# Patient Record
Sex: FEMALE | Race: WHITE | NOT HISPANIC OR LATINO | Employment: FULL TIME | URBAN - METROPOLITAN AREA
[De-identification: names, ages, dates, MRNs, and addresses within clinical notes are randomized per-mention and may not be internally consistent; named-entity substitution may affect disease eponyms.]

---

## 2023-09-08 PROBLEM — G47.00 INSOMNIA: Status: ACTIVE | Noted: 2023-09-08

## 2023-09-08 PROBLEM — F41.9 ANXIETY: Status: ACTIVE | Noted: 2023-09-08

## 2023-09-08 PROBLEM — F17.200 LIGHT TOBACCO SMOKER: Status: ACTIVE | Noted: 2023-09-08

## 2023-09-08 PROBLEM — N92.1 METRORRHAGIA: Status: ACTIVE | Noted: 2023-09-08

## 2023-09-08 PROBLEM — F43.0 STRESS REACTION: Status: ACTIVE | Noted: 2023-09-08

## 2023-09-08 PROBLEM — Z86.59 HISTORY OF ADHD: Status: ACTIVE | Noted: 2023-09-08

## 2023-09-08 PROBLEM — K21.9 GASTROESOPHAGEAL REFLUX DISEASE: Status: ACTIVE | Noted: 2023-09-08

## 2023-09-08 PROBLEM — F32.A DEPRESSION: Status: ACTIVE | Noted: 2023-09-08

## 2023-09-08 PROBLEM — F41.9 ANXIETY AND DEPRESSION: Status: ACTIVE | Noted: 2023-09-08

## 2023-09-08 PROBLEM — F32.A ANXIETY AND DEPRESSION: Status: ACTIVE | Noted: 2023-09-08

## 2023-09-08 RX ORDER — HYDROGEN PEROXIDE 3 %
1 SOLUTION, NON-ORAL MISCELLANEOUS DAILY PRN
COMMUNITY
Start: 2016-02-12

## 2023-09-08 RX ORDER — EPINEPHRINE 0.3 MG/.3ML
INJECTION INTRAMUSCULAR
COMMUNITY

## 2023-09-08 RX ORDER — CITALOPRAM 40 MG/1
1 TABLET, FILM COATED ORAL DAILY
COMMUNITY
Start: 2022-04-15 | End: 2024-04-22

## 2023-09-08 RX ORDER — ALPRAZOLAM 0.5 MG/1
1 TABLET ORAL DAILY PRN
COMMUNITY
Start: 2015-04-29

## 2023-09-08 RX ORDER — HYDROXYZINE HYDROCHLORIDE 25 MG/1
1 TABLET, FILM COATED ORAL NIGHTLY PRN
COMMUNITY
Start: 2022-08-19

## 2023-09-08 RX ORDER — NORGESTIMATE AND ETHINYL ESTRADIOL 0.25-0.035
1 KIT ORAL DAILY
COMMUNITY
Start: 2022-08-19

## 2023-09-08 RX ORDER — OMEPRAZOLE 40 MG/1
1 CAPSULE, DELAYED RELEASE ORAL
COMMUNITY
Start: 2023-02-14

## 2023-09-08 RX ORDER — PAROXETINE HYDROCHLORIDE 20 MG/1
1 TABLET, FILM COATED ORAL 2 TIMES DAILY
COMMUNITY
Start: 2015-05-15

## 2023-09-08 RX ORDER — DROSPIRENONE AND ETHINYL ESTRADIOL 0.02-3(28)
1 KIT ORAL DAILY
COMMUNITY
Start: 2016-02-12

## 2023-10-09 PROCEDURE — 87800 DETECT AGNT MULT DNA DIREC: CPT

## 2023-10-09 PROCEDURE — 87661 TRICHOMONAS VAGINALIS AMPLIF: CPT

## 2023-10-10 ENCOUNTER — LAB REQUISITION (OUTPATIENT)
Dept: LAB | Facility: HOSPITAL | Age: 28
End: 2023-10-10
Payer: COMMERCIAL

## 2023-10-10 DIAGNOSIS — Z20.2 CONTACT WITH AND (SUSPECTED) EXPOSURE TO INFECTIONS WITH A PREDOMINANTLY SEXUAL MODE OF TRANSMISSION: ICD-10-CM

## 2023-10-12 LAB
C TRACH RRNA SPEC QL NAA+PROBE: NEGATIVE
N GONORRHOEA DNA SPEC QL PROBE+SIG AMP: NEGATIVE
T VAGINALIS RRNA SPEC QL NAA+PROBE: NEGATIVE

## 2024-03-10 DIAGNOSIS — F41.9 ANXIETY AND DEPRESSION: Primary | ICD-10-CM

## 2024-03-10 DIAGNOSIS — F32.A ANXIETY AND DEPRESSION: Primary | ICD-10-CM

## 2024-04-10 NOTE — TELEPHONE ENCOUNTER
SPOKE TO THE PATIENT AND THE PATIENT STATED THAT SHE DID NOT WANT TO MAKE AN APPOINTMENT AT THIS TIME BECAUSE SHE DOES NOT HAVE ANY INSURANCE

## 2024-04-22 RX ORDER — CITALOPRAM 40 MG/1
40 TABLET, FILM COATED ORAL DAILY
Qty: 30 TABLET | Refills: 1 | Status: SHIPPED | OUTPATIENT
Start: 2024-04-22

## 2024-11-08 NOTE — PROGRESS NOTES
Subjective   Patient ID:   Vickie Todd is a 29 y.o. female who presents for Establish Care.  HPI  New patient here today to establish care with myself.  Previous PCP:   Last seen:    Anxiety/Depression:  Taking Celexa - has not had a supply recently.  Has been taking it on and off for the past few months.  Had been on Hydroxyzine and Paxil in the past.  Interested in genetic testing for medications instead of trial and error.   Denies SI/HI.    GERD:  Taking Prilosec.  Needing a refill.    Abscess:  Symptoms x a few weeks.  Getting bigger.  Was painful.  Left buttocks region.    Health maintenance:  Smoking: Former smoker.  Labs: DUE  Influenza: Nov 2024.    Review of Systems  12 point review of systems negative unless stated above in HPI    Vitals:    11/14/24 0851   BP: 120/90   Pulse: 93   SpO2: 99%     Physical Exam  General: Alert and oriented, well nourished, no acute distress.  Lungs: Clear to auscultation, non-labored respiration.  Heart: Normal rate, regular rhythm, no murmur, gallop or edema.  Neurologic: Awake, alert, and oriented X3, CN II-XII intact.  Psychiatric: Cooperative, appropriate mood and affect.  Skin: Chaperone offered and declined. Small abscess noted on left buttocks    Assessment/Plan   It was nice meeting you today!  I have refilled the Celexa.  I have placed a referral to psychiatry for further management and for genetic testing.  I have ordered some labs to be done as soon as you can.  We will call you with the results.  I have sent in Keflex to try for the abscess.  Consider general surgery and/or a 2nd antibiotic.  Flu vaccine given today.  If symptoms persist or worsen despite current plan of care, please contact your healthcare provider for further evaluation.  Patient instructed to contact the office if there are any questions regarding their care or treatment.   [facility] [phone]    Fu 1 month  Diagnoses and all orders for this visit:  Anxiety and  depression  -     citalopram (CeleXA) 40 mg tablet; Take 1 tablet (40 mg) by mouth once daily.  -     Referral to Psychiatry; Future  -     Comprehensive Metabolic Panel; Future  -     Lipid Panel; Future  -     CBC and Auto Differential; Future  Gastroesophageal reflux disease without esophagitis  -     omeprazole (PriLOSEC) 40 mg DR capsule; Take 1 capsule (40 mg) by mouth once daily in the morning. Take before meals.  Abscess  -     cephalexin (Keflex) 500 mg capsule; Take 1 capsule (500 mg) by mouth 2 times a day for 7 days.  Need for influenza vaccination  -     Flu vaccine, trivalent, preservative free, age 6 months and greater (Fluraix/Fluzone/Flulaval)  Elevated blood pressure reading

## 2024-11-14 ENCOUNTER — OFFICE VISIT (OUTPATIENT)
Dept: PRIMARY CARE | Facility: CLINIC | Age: 29
End: 2024-11-14
Payer: COMMERCIAL

## 2024-11-14 VITALS
HEART RATE: 93 BPM | SYSTOLIC BLOOD PRESSURE: 120 MMHG | DIASTOLIC BLOOD PRESSURE: 90 MMHG | BODY MASS INDEX: 23.38 KG/M2 | OXYGEN SATURATION: 99 % | WEIGHT: 132 LBS

## 2024-11-14 DIAGNOSIS — Z23 NEED FOR INFLUENZA VACCINATION: ICD-10-CM

## 2024-11-14 DIAGNOSIS — K21.9 GASTROESOPHAGEAL REFLUX DISEASE WITHOUT ESOPHAGITIS: ICD-10-CM

## 2024-11-14 DIAGNOSIS — L02.91 ABSCESS: ICD-10-CM

## 2024-11-14 DIAGNOSIS — F41.9 ANXIETY AND DEPRESSION: Primary | ICD-10-CM

## 2024-11-14 DIAGNOSIS — F32.A ANXIETY AND DEPRESSION: Primary | ICD-10-CM

## 2024-11-14 DIAGNOSIS — R03.0 ELEVATED BLOOD PRESSURE READING: ICD-10-CM

## 2024-11-14 PROCEDURE — 90656 IIV3 VACC NO PRSV 0.5 ML IM: CPT | Performed by: PHYSICIAN ASSISTANT

## 2024-11-14 PROCEDURE — 99204 OFFICE O/P NEW MOD 45 MIN: CPT | Performed by: PHYSICIAN ASSISTANT

## 2024-11-14 PROCEDURE — 90471 IMMUNIZATION ADMIN: CPT | Performed by: PHYSICIAN ASSISTANT

## 2024-11-14 RX ORDER — OMEPRAZOLE 40 MG/1
40 CAPSULE, DELAYED RELEASE ORAL
Qty: 90 CAPSULE | Refills: 1 | Status: SHIPPED | OUTPATIENT
Start: 2024-11-14 | End: 2025-05-13

## 2024-11-14 RX ORDER — CITALOPRAM 40 MG/1
40 TABLET, FILM COATED ORAL DAILY
Qty: 30 TABLET | Refills: 1 | Status: SHIPPED | OUTPATIENT
Start: 2024-11-14

## 2024-11-14 RX ORDER — CEPHALEXIN 500 MG/1
500 CAPSULE ORAL 2 TIMES DAILY
Qty: 14 CAPSULE | Refills: 0 | Status: SHIPPED | OUTPATIENT
Start: 2024-11-14 | End: 2024-11-21

## 2024-11-14 ASSESSMENT — PATIENT HEALTH QUESTIONNAIRE - PHQ9
SUM OF ALL RESPONSES TO PHQ QUESTIONS 1-9: 16
1. LITTLE INTEREST OR PLEASURE IN DOING THINGS: NEARLY EVERY DAY
3. TROUBLE FALLING OR STAYING ASLEEP OR SLEEPING TOO MUCH: MORE THAN HALF THE DAYS
9. THOUGHTS THAT YOU WOULD BE BETTER OFF DEAD, OR OF HURTING YOURSELF: SEVERAL DAYS
7. TROUBLE CONCENTRATING ON THINGS, SUCH AS READING THE NEWSPAPER OR WATCHING TELEVISION: NEARLY EVERY DAY
6. FEELING BAD ABOUT YOURSELF - OR THAT YOU ARE A FAILURE OR HAVE LET YOURSELF OR YOUR FAMILY DOWN: NEARLY EVERY DAY
10. IF YOU CHECKED OFF ANY PROBLEMS, HOW DIFFICULT HAVE THESE PROBLEMS MADE IT FOR YOU TO DO YOUR WORK, TAKE CARE OF THINGS AT HOME, OR GET ALONG WITH OTHER PEOPLE: SOMEWHAT DIFFICULT
4. FEELING TIRED OR HAVING LITTLE ENERGY: SEVERAL DAYS
SUM OF ALL RESPONSES TO PHQ9 QUESTIONS 1 AND 2: 6
5. POOR APPETITE OR OVEREATING: NOT AT ALL
2. FEELING DOWN, DEPRESSED OR HOPELESS: NEARLY EVERY DAY
8. MOVING OR SPEAKING SO SLOWLY THAT OTHER PEOPLE COULD HAVE NOTICED. OR THE OPPOSITE, BEING SO FIGETY OR RESTLESS THAT YOU HAVE BEEN MOVING AROUND A LOT MORE THAN USUAL: NOT AT ALL

## 2024-11-14 ASSESSMENT — ENCOUNTER SYMPTOMS
DEPRESSION: 0
LOSS OF SENSATION IN FEET: 0
OCCASIONAL FEELINGS OF UNSTEADINESS: 0

## 2024-11-14 ASSESSMENT — LIFESTYLE VARIABLES
HOW OFTEN DO YOU HAVE A DRINK CONTAINING ALCOHOL: 2-3 TIMES A WEEK
HOW MANY STANDARD DRINKS CONTAINING ALCOHOL DO YOU HAVE ON A TYPICAL DAY: 1 OR 2
HOW OFTEN DO YOU HAVE SIX OR MORE DRINKS ON ONE OCCASION: NEVER
SKIP TO QUESTIONS 9-10: 1
AUDIT-C TOTAL SCORE: 3

## 2024-11-14 ASSESSMENT — PAIN SCALES - GENERAL: PAINLEVEL_OUTOF10: 0-NO PAIN

## 2024-12-09 NOTE — PROGRESS NOTES
Subjective   Patient ID:   Vickie Todd is a 29 y.o. female who presents for Follow-up, Hand Pain, and FOOT PAIN.  HPI  Anxiety/Depression:  I referred to psychiatry for genetic testing last visit.  Has not had time to schedule this yet.  Taking Celexa.  Had been on Hydroxyzine and Paxil in the past.  Interested in genetic testing for medications instead of trial and error.   Denies SI/HI.    GERD:  Taking Prilosec.    Abscess:  I gave Keflex last visit.  Consider general surgery.  Symptoms x a few weeks.  Getting bigger.  Was painful.  Left buttocks region.    Tinea pedis:  Symptoms x years.  Both feet.  Dryness and cracking.  On her feet all day.    Health maintenance:  Smoking: Former smoker.  Labs: DUE - ordered last visit.  Influenza: Nov 2024.    Review of Systems  12 point review of systems negative unless stated above in HPI    Vitals:    12/19/24 0956   BP: 116/84   Pulse: 91   SpO2: 98%     Physical Exam  General: Alert and oriented, well nourished, no acute distress.  Lungs: Clear to auscultation, non-labored respiration.  Heart: Normal rate, regular rhythm, no murmur, gallop or edema.  Neurologic: Awake, alert, and oriented X3, CN II-XII intact.  Psychiatric: Cooperative, appropriate mood and affect.    Assessment/Plan   It was good seeing you!  Refills were sent.  I have sent in Nystatin powder to try for your feet.  I have placed a referral to podiatry for further management.  I have placed a referral to dermatology for further management.  If symptoms persist or worsen despite current plan of care, please contact your healthcare provider for further evaluation.  Patient instructed to contact the office if there are any questions regarding their care or treatment.   [facility] [phone]    Fu 6 months  Diagnoses and all orders for this visit:  Anxiety and depression  -     citalopram (CeleXA) 40 mg tablet; Take 1 tablet (40 mg) by mouth once daily.  Abscess  Gastroesophageal  reflux disease without esophagitis  Allergy to banana  -     EPINEPHrine (EpiPen 2-Foster) 0.3 mg/0.3 mL injection syringe; Inject 0.3 mL (0.3 mg) into the muscle 1 time if needed for anaphylaxis for up to 2 doses. Inject into upper leg. Call 911 after use.  Skin lesions  -     Referral to Dermatology  Tinea pedis of both feet  -     nystatin (Mycostatin) 100,000 unit/gram powder; Apply 1 Application topically 2 times a day.  -     Referral to Podiatry; Future

## 2024-12-19 ENCOUNTER — OFFICE VISIT (OUTPATIENT)
Dept: PRIMARY CARE | Facility: CLINIC | Age: 29
End: 2024-12-19
Payer: COMMERCIAL

## 2024-12-19 VITALS
HEART RATE: 91 BPM | DIASTOLIC BLOOD PRESSURE: 84 MMHG | SYSTOLIC BLOOD PRESSURE: 116 MMHG | OXYGEN SATURATION: 98 % | BODY MASS INDEX: 23.31 KG/M2 | WEIGHT: 131.6 LBS

## 2024-12-19 DIAGNOSIS — Z91.018 ALLERGY TO BANANA: ICD-10-CM

## 2024-12-19 DIAGNOSIS — L02.91 ABSCESS: ICD-10-CM

## 2024-12-19 DIAGNOSIS — F32.A ANXIETY AND DEPRESSION: Primary | ICD-10-CM

## 2024-12-19 DIAGNOSIS — B35.3 TINEA PEDIS OF BOTH FEET: ICD-10-CM

## 2024-12-19 DIAGNOSIS — L98.9 SKIN LESIONS: ICD-10-CM

## 2024-12-19 DIAGNOSIS — F41.9 ANXIETY AND DEPRESSION: Primary | ICD-10-CM

## 2024-12-19 DIAGNOSIS — K21.9 GASTROESOPHAGEAL REFLUX DISEASE WITHOUT ESOPHAGITIS: ICD-10-CM

## 2024-12-19 PROCEDURE — 99214 OFFICE O/P EST MOD 30 MIN: CPT | Performed by: PHYSICIAN ASSISTANT

## 2024-12-19 RX ORDER — EPINEPHRINE 0.3 MG/.3ML
1 INJECTION INTRAMUSCULAR ONCE AS NEEDED
Qty: 1 EACH | Refills: 1 | Status: SHIPPED | OUTPATIENT
Start: 2024-12-19

## 2024-12-19 RX ORDER — CITALOPRAM 40 MG/1
40 TABLET, FILM COATED ORAL DAILY
Qty: 90 TABLET | Refills: 1 | Status: SHIPPED | OUTPATIENT
Start: 2024-12-19 | End: 2025-06-17

## 2024-12-19 RX ORDER — NYSTATIN 100000 [USP'U]/G
1 POWDER TOPICAL 2 TIMES DAILY
Qty: 60 G | Refills: 1 | Status: SHIPPED | OUTPATIENT
Start: 2024-12-19 | End: 2025-12-19

## 2024-12-19 ASSESSMENT — LIFESTYLE VARIABLES
HOW MANY STANDARD DRINKS CONTAINING ALCOHOL DO YOU HAVE ON A TYPICAL DAY: 1 OR 2
SKIP TO QUESTIONS 9-10: 1
AUDIT-C TOTAL SCORE: 2
HOW OFTEN DO YOU HAVE SIX OR MORE DRINKS ON ONE OCCASION: NEVER
HOW OFTEN DO YOU HAVE A DRINK CONTAINING ALCOHOL: 2-4 TIMES A MONTH

## 2024-12-19 ASSESSMENT — ENCOUNTER SYMPTOMS
OCCASIONAL FEELINGS OF UNSTEADINESS: 0
LOSS OF SENSATION IN FEET: 0
DEPRESSION: 0

## 2024-12-19 ASSESSMENT — PAIN SCALES - GENERAL: PAINLEVEL_OUTOF10: 2

## 2024-12-19 ASSESSMENT — PATIENT HEALTH QUESTIONNAIRE - PHQ9: 1. LITTLE INTEREST OR PLEASURE IN DOING THINGS: NOT AT ALL

## 2025-01-14 ENCOUNTER — TELEPHONE (OUTPATIENT)
Dept: PRIMARY CARE | Facility: CLINIC | Age: 30
End: 2025-01-14
Payer: COMMERCIAL

## 2025-01-14 DIAGNOSIS — Z30.9 ENCOUNTER FOR CONTRACEPTIVE MANAGEMENT, UNSPECIFIED TYPE: Primary | ICD-10-CM

## 2025-01-14 RX ORDER — NORGESTIMATE AND ETHINYL ESTRADIOL 7DAYSX3 28
1 KIT ORAL DAILY
Qty: 28 TABLET | Refills: 12 | Status: SHIPPED | OUTPATIENT
Start: 2025-01-14 | End: 2026-01-14

## 2025-04-07 DIAGNOSIS — B35.3 TINEA PEDIS OF BOTH FEET: ICD-10-CM

## 2025-04-07 RX ORDER — NYSTATIN 100000 [USP'U]/G
1 POWDER TOPICAL 2 TIMES DAILY
Qty: 60 G | Refills: 1 | Status: SHIPPED | OUTPATIENT
Start: 2025-04-07 | End: 2026-04-07

## 2025-04-25 ENCOUNTER — TELEPHONE (OUTPATIENT)
Dept: PRIMARY CARE | Facility: CLINIC | Age: 30
End: 2025-04-25
Payer: COMMERCIAL

## 2025-04-25 NOTE — TELEPHONE ENCOUNTER
Patient has a recurring cyst in her right ear, and would like ATB called into [facility] in [place]. Any questions call patient [phone].

## 2025-04-28 NOTE — PROGRESS NOTES
Subjective   Patient ID:   Vickie Todd is a 30 y.o. female who presents for No chief complaint on file..  HPI  Here with acute symptoms:  Symptoms x    Anxiety/Depression:  I referred to psychiatry for genetic testing previously.  Has not had time to schedule this yet.  Taking Celexa.  Had been on Hydroxyzine and Paxil in the past.  Interested in genetic testing for medications instead of trial and error.   Denies SI/HI.    GERD:  Taking Prilosec.    Abscess:  I referred to dermatology last visit.  I gave Keflex previously.  Consider general surgery.  Symptoms x a few weeks.  Getting bigger.  Was painful.  Left buttocks region.    Tinea pedis:  I gave Nystatin powder last visit.  I also referred to podiatry.  Symptoms x years.  Both feet.  Dryness and cracking.  On her feet all day.    Health maintenance:  Smoking: Former smoker.  Labs: DUE - ordered and not done.  Influenza: Nov 2024.    Review of Systems  12 point review of systems negative unless stated above in HPI    There were no vitals filed for this visit.    Physical Exam  General: Alert and oriented, well nourished, no acute distress.  Lungs: Clear to auscultation, non-labored respiration.  Heart: Normal rate, regular rhythm, no murmur, gallop or edema.  Neurologic: Awake, alert, and oriented X3, CN II-XII intact.  Psychiatric: Cooperative, appropriate mood and affect.    Assessment/Plan     Diagnoses and all orders for this visit:  Anxiety and depression  Abscess  Gastroesophageal reflux disease without esophagitis  Skin lesions  Tinea pedis of both feet

## 2025-05-01 ENCOUNTER — APPOINTMENT (OUTPATIENT)
Dept: PRIMARY CARE | Facility: CLINIC | Age: 30
End: 2025-05-01
Payer: COMMERCIAL

## 2025-05-01 DIAGNOSIS — K21.9 GASTROESOPHAGEAL REFLUX DISEASE WITHOUT ESOPHAGITIS: ICD-10-CM

## 2025-05-01 DIAGNOSIS — L02.91 ABSCESS: ICD-10-CM

## 2025-05-01 DIAGNOSIS — F32.A ANXIETY AND DEPRESSION: Primary | ICD-10-CM

## 2025-05-01 DIAGNOSIS — L98.9 SKIN LESIONS: ICD-10-CM

## 2025-05-01 DIAGNOSIS — B35.3 TINEA PEDIS OF BOTH FEET: ICD-10-CM

## 2025-05-01 DIAGNOSIS — F41.9 ANXIETY AND DEPRESSION: Primary | ICD-10-CM

## 2025-06-09 NOTE — PROGRESS NOTES
Subjective   Patient ID:   Vickie Todd is a 30 y.o. female who presents for Follow-up.  HPI  Labs were ordered and not done.    Anxiety/Depression:  I referred to psychiatry for genetic testing previously - has not had time to do this yet.  Taking Celexa.  Had been on Hydroxyzine and Paxil in the past.  Interested in genetic testing for medications instead of trial and error.   Denies SI/HI.    GERD:  Taking Prilosec.    Abscess:  This has resolved.  I gave Keflex previously.  Consider general surgery.  Was painful.  Left buttocks region.    Tinea pedis:  I gave Nystatin powder last visit.  This seems to be helpful!  I also referred to podiatry and dermatology.  Symptoms x years.  Both feet.  Dryness and cracking.  On her feet all day.    Health maintenance:  Smoking: Former smoker.  Labs: DUE - ordered and not done.  Influenza: Nov 2024.    Review of Systems  12 point review of systems negative unless stated above in HPI    Vitals:    06/19/25 1001   BP: 92/66   Pulse: 83   SpO2: 99%     Physical Exam  General: Alert and oriented, well nourished, no acute distress.  Lungs: Clear to auscultation, non-labored respiration.  Heart: Normal rate, regular rhythm, no murmur, gallop or edema.  Neurologic: Awake, alert, and oriented X3, CN II-XII intact.  Psychiatric: Cooperative, appropriate mood and affect.    Assessment/Plan   It was good seeing you!  I have ordered some labs to be done as soon as you can.  We will call you with the results.  Continue the same medications.  Chronic conditions are stable.  Call with questions or concerns.    Follow up  Yearly and as needed  Diagnoses and all orders for this visit:  Anxiety and depression  -     Comprehensive Metabolic Panel; Future  -     Lipid Panel; Future  -     CBC and Auto Differential; Future  Gastroesophageal reflux disease without esophagitis  -     omeprazole (PriLOSEC) 40 mg DR capsule; Take 1 capsule (40 mg) by mouth once daily in the morning. Take before  meals.  Tinea pedis of both feet  Abscess  Lipid screening  -     Lipid Panel; Future  Allergy to banana  -     EPINEPHrine (EpiPen 2-Foster) 0.3 mg/0.3 mL injection syringe; Inject 0.3 mL (0.3 mg) into the muscle 1 time if needed for anaphylaxis for up to 2 doses. Inject into upper leg. Call 911 after use.  Allergy, sequela  -     fluticasone (Flonase) 50 mcg/actuation nasal spray; Administer 1 spray into each nostril once daily. Shake gently. Before first use, prime pump. After use, clean tip and replace cap.  -     cetirizine (ZyrTEC) 10 mg tablet; Take 1 tablet (10 mg) by mouth once daily.

## 2025-06-19 ENCOUNTER — OFFICE VISIT (OUTPATIENT)
Dept: PRIMARY CARE | Facility: CLINIC | Age: 30
End: 2025-06-19
Payer: COMMERCIAL

## 2025-06-19 VITALS
DIASTOLIC BLOOD PRESSURE: 66 MMHG | BODY MASS INDEX: 25.08 KG/M2 | OXYGEN SATURATION: 99 % | HEART RATE: 83 BPM | SYSTOLIC BLOOD PRESSURE: 92 MMHG | WEIGHT: 141.6 LBS

## 2025-06-19 DIAGNOSIS — Z91.018 ALLERGY TO BANANA: ICD-10-CM

## 2025-06-19 DIAGNOSIS — B35.3 TINEA PEDIS OF BOTH FEET: ICD-10-CM

## 2025-06-19 DIAGNOSIS — F41.9 ANXIETY AND DEPRESSION: Primary | ICD-10-CM

## 2025-06-19 DIAGNOSIS — K21.9 GASTROESOPHAGEAL REFLUX DISEASE WITHOUT ESOPHAGITIS: ICD-10-CM

## 2025-06-19 DIAGNOSIS — T78.40XS ALLERGY, SEQUELA: ICD-10-CM

## 2025-06-19 DIAGNOSIS — F32.A ANXIETY AND DEPRESSION: Primary | ICD-10-CM

## 2025-06-19 DIAGNOSIS — L02.91 ABSCESS: ICD-10-CM

## 2025-06-19 DIAGNOSIS — Z13.220 LIPID SCREENING: ICD-10-CM

## 2025-06-19 PROCEDURE — 4004F PT TOBACCO SCREEN RCVD TLK: CPT | Performed by: PHYSICIAN ASSISTANT

## 2025-06-19 PROCEDURE — 99213 OFFICE O/P EST LOW 20 MIN: CPT | Performed by: PHYSICIAN ASSISTANT

## 2025-06-19 RX ORDER — EPINEPHRINE 0.3 MG/.3ML
1 INJECTION INTRAMUSCULAR ONCE AS NEEDED
Qty: 1 EACH | Refills: 1 | Status: SHIPPED | OUTPATIENT
Start: 2025-06-19

## 2025-06-19 RX ORDER — CETIRIZINE HYDROCHLORIDE 10 MG/1
10 TABLET ORAL DAILY
Qty: 90 TABLET | Refills: 1 | Status: SHIPPED | OUTPATIENT
Start: 2025-06-19 | End: 2025-12-16

## 2025-06-19 RX ORDER — FLUTICASONE PROPIONATE 50 MCG
1 SPRAY, SUSPENSION (ML) NASAL DAILY
Qty: 16 G | Refills: 1 | Status: SHIPPED | OUTPATIENT
Start: 2025-06-19 | End: 2026-06-19

## 2025-06-19 RX ORDER — OMEPRAZOLE 40 MG/1
40 CAPSULE, DELAYED RELEASE ORAL
Qty: 90 CAPSULE | Refills: 1 | Status: SHIPPED | OUTPATIENT
Start: 2025-06-19 | End: 2025-12-16

## 2025-06-19 ASSESSMENT — PATIENT HEALTH QUESTIONNAIRE - PHQ9
1. LITTLE INTEREST OR PLEASURE IN DOING THINGS: NOT AT ALL
SUM OF ALL RESPONSES TO PHQ9 QUESTIONS 1 AND 2: 0
2. FEELING DOWN, DEPRESSED OR HOPELESS: NOT AT ALL

## 2025-06-19 ASSESSMENT — ENCOUNTER SYMPTOMS
LOSS OF SENSATION IN FEET: 0
OCCASIONAL FEELINGS OF UNSTEADINESS: 0
DEPRESSION: 0

## 2025-06-19 ASSESSMENT — PAIN SCALES - GENERAL: PAINLEVEL_OUTOF10: 1

## 2025-06-30 DIAGNOSIS — F41.9 ANXIETY AND DEPRESSION: ICD-10-CM

## 2025-06-30 DIAGNOSIS — F32.A ANXIETY AND DEPRESSION: ICD-10-CM

## 2025-06-30 RX ORDER — CITALOPRAM 40 MG/1
40 TABLET ORAL DAILY
Qty: 90 TABLET | Refills: 1 | Status: SHIPPED | OUTPATIENT
Start: 2025-06-30 | End: 2025-12-27

## 2026-06-25 ENCOUNTER — APPOINTMENT (OUTPATIENT)
Dept: PRIMARY CARE | Facility: CLINIC | Age: 31
End: 2026-06-25
Payer: COMMERCIAL